# Patient Record
Sex: MALE | Race: WHITE | NOT HISPANIC OR LATINO
[De-identification: names, ages, dates, MRNs, and addresses within clinical notes are randomized per-mention and may not be internally consistent; named-entity substitution may affect disease eponyms.]

---

## 2019-06-10 ENCOUNTER — APPOINTMENT (OUTPATIENT)
Dept: OTOLARYNGOLOGY | Facility: CLINIC | Age: 34
End: 2019-06-10
Payer: COMMERCIAL

## 2019-06-10 VITALS
DIASTOLIC BLOOD PRESSURE: 83 MMHG | TEMPERATURE: 98.5 F | HEART RATE: 67 BPM | OXYGEN SATURATION: 97 % | SYSTOLIC BLOOD PRESSURE: 126 MMHG

## 2019-06-10 DIAGNOSIS — J32.8 OTHER CHRONIC SINUSITIS: ICD-10-CM

## 2019-06-10 DIAGNOSIS — R55 SYNCOPE AND COLLAPSE: ICD-10-CM

## 2019-06-10 PROBLEM — Z00.00 ENCOUNTER FOR PREVENTIVE HEALTH EXAMINATION: Status: ACTIVE | Noted: 2019-06-10

## 2019-06-10 PROCEDURE — 31231 NASAL ENDOSCOPY DX: CPT

## 2019-06-10 PROCEDURE — 99203 OFFICE O/P NEW LOW 30 MIN: CPT | Mod: 25

## 2019-06-10 RX ORDER — AMOXICILLIN AND CLAVULANATE POTASSIUM 875; 125 MG/1; MG/1
875-125 TABLET, COATED ORAL
Qty: 20 | Refills: 0 | Status: ACTIVE | COMMUNITY
Start: 2019-06-03

## 2019-06-10 RX ORDER — CALCIPOTRIENE 50 UG/G
0.01 OINTMENT TOPICAL
Qty: 60 | Refills: 0 | Status: ACTIVE | COMMUNITY
Start: 2019-05-09

## 2019-06-10 RX ORDER — CLOBETASOL PROPIONATE 0.5 MG/G
0.05 CREAM TOPICAL
Qty: 15 | Refills: 0 | Status: ACTIVE | COMMUNITY
Start: 2018-11-29

## 2019-06-10 NOTE — HISTORY OF PRESENT ILLNESS
[de-identified] : 33 years old male patient with history of persistent sinus infection and headaches for the past 8 months.   Patient is present today in the office with Nasal Congestion.  Deviated Nasal Septum to the left.  Patient had a Vasovagal Reaction during office visit

## 2019-06-10 NOTE — PROCEDURE
[Congested] : congested [Deviated to the Lt] : deviated to the left [Image(s) Captured] : image(s) captured and filed [Topical Lidocaine] : topical lidocaine [Flexible Endoscope] : examined with the flexible endoscope [Serial Number: ___] : Serial Number: [unfilled]

## 2019-06-10 NOTE — REASON FOR VISIT
[Initial Evaluation] : an initial evaluation for [FreeTextEntry2] : persistent sinus infection and headaches for the past 8 months.  Patient states his level of severity is a level 8 out of 10 and it occurs constant.  Patient states nothing helps to improve or worsens  his persistent sinus infection and headaches for the past 8 months.

## 2019-06-10 NOTE — REVIEW OF SYSTEMS
[Patient Intake Form Reviewed] : Patient intake form was reviewed [Ear Pain] : ear pain [Hearing Loss] : hearing loss [Nasal Congestion] : nasal congestion [Sinus Pain] : sinus pain [Sinus Pressure] : sinus pressure [As Noted in HPI] : as noted in HPI [Negative] : Heme/Lymph [FreeTextEntry4] : Headaches  [FreeTextEntry2] : Poor sleep

## 2019-06-10 NOTE — PHYSICAL EXAM
[] : septum deviated bilaterally [Normal] : mucosa is normal [Midline] : trachea located in midline position

## 2019-06-11 ENCOUNTER — FORM ENCOUNTER (OUTPATIENT)
Age: 34
End: 2019-06-11

## 2019-06-12 ENCOUNTER — OUTPATIENT (OUTPATIENT)
Dept: OUTPATIENT SERVICES | Facility: HOSPITAL | Age: 34
LOS: 1 days | End: 2019-06-12
Payer: COMMERCIAL

## 2019-06-12 ENCOUNTER — APPOINTMENT (OUTPATIENT)
Dept: CT IMAGING | Facility: HOSPITAL | Age: 34
End: 2019-06-12
Payer: COMMERCIAL

## 2019-06-12 PROCEDURE — 70486 CT MAXILLOFACIAL W/O DYE: CPT | Mod: 26

## 2019-06-12 PROCEDURE — 70486 CT MAXILLOFACIAL W/O DYE: CPT

## 2019-06-13 ENCOUNTER — APPOINTMENT (OUTPATIENT)
Dept: OTOLARYNGOLOGY | Facility: CLINIC | Age: 34
End: 2019-06-13
Payer: COMMERCIAL

## 2019-06-13 VITALS — WEIGHT: 155 LBS | BODY MASS INDEX: 20.99 KG/M2 | HEIGHT: 72 IN

## 2019-06-13 VITALS
HEART RATE: 75 BPM | RESPIRATION RATE: 14 BRPM | SYSTOLIC BLOOD PRESSURE: 142 MMHG | TEMPERATURE: 97.9 F | OXYGEN SATURATION: 96 % | DIASTOLIC BLOOD PRESSURE: 85 MMHG

## 2019-06-13 DIAGNOSIS — J34.3 HYPERTROPHY OF NASAL TURBINATES: ICD-10-CM

## 2019-06-13 DIAGNOSIS — J34.2 DEVIATED NASAL SEPTUM: ICD-10-CM

## 2019-06-13 DIAGNOSIS — J34.89 OTHER SPECIFIED DISORDERS OF NOSE AND NASAL SINUSES: ICD-10-CM

## 2019-06-13 PROCEDURE — 99213 OFFICE O/P EST LOW 20 MIN: CPT

## 2019-06-13 RX ORDER — METHYLPREDNISOLONE 4 MG/1
4 TABLET ORAL
Qty: 1 | Refills: 0 | Status: ACTIVE | COMMUNITY
Start: 2019-06-13 | End: 1900-01-01

## 2019-06-13 NOTE — REASON FOR VISIT
[Subsequent Evaluation] : a subsequent evaluation for [FreeTextEntry2] : persistent Nasal congestion, nasal obstruction.  Sinus CT-Scan interpretation

## 2021-01-25 NOTE — HISTORY OF PRESENT ILLNESS
[de-identified] : 33 years old male patient with history of persistent Nasal congestion, nasal obstruction. Patient is present today in the office for  Sinus CT-Scan interpretation.  Deviated Nasal Septum  Left, Turbinate Hypertrophy 
for hand placement and technique for safety/verbal cues/1 person assist